# Patient Record
Sex: MALE | Race: BLACK OR AFRICAN AMERICAN | NOT HISPANIC OR LATINO | URBAN - METROPOLITAN AREA
[De-identification: names, ages, dates, MRNs, and addresses within clinical notes are randomized per-mention and may not be internally consistent; named-entity substitution may affect disease eponyms.]

---

## 2022-07-20 ENCOUNTER — EMERGENCY (EMERGENCY)
Facility: HOSPITAL | Age: 32
LOS: 1 days | Discharge: HOME | End: 2022-07-20
Attending: EMERGENCY MEDICINE | Admitting: EMERGENCY MEDICINE
Payer: SELF-PAY

## 2022-07-20 VITALS
OXYGEN SATURATION: 98 % | RESPIRATION RATE: 18 BRPM | SYSTOLIC BLOOD PRESSURE: 122 MMHG | TEMPERATURE: 98 F | HEART RATE: 62 BPM | DIASTOLIC BLOOD PRESSURE: 69 MMHG

## 2022-07-20 VITALS
DIASTOLIC BLOOD PRESSURE: 75 MMHG | WEIGHT: 171.08 LBS | RESPIRATION RATE: 18 BRPM | TEMPERATURE: 99 F | SYSTOLIC BLOOD PRESSURE: 127 MMHG | OXYGEN SATURATION: 98 % | HEIGHT: 75 IN | HEART RATE: 87 BPM

## 2022-07-20 PROCEDURE — 99284 EMERGENCY DEPT VISIT MOD MDM: CPT

## 2022-07-20 PROCEDURE — 96372 THER/PROPH/DIAG INJ SC/IM: CPT

## 2022-07-20 PROCEDURE — 99283 EMERGENCY DEPT VISIT LOW MDM: CPT | Mod: 25

## 2022-07-20 RX ORDER — IBUPROFEN 200 MG
1 TABLET ORAL
Qty: 30 | Refills: 0
Start: 2022-07-20

## 2022-07-20 RX ORDER — LIDOCAINE 4 G/100G
1 CREAM TOPICAL ONCE
Refills: 0 | Status: COMPLETED | OUTPATIENT
Start: 2022-07-20 | End: 2022-07-20

## 2022-07-20 RX ORDER — KETOROLAC TROMETHAMINE 30 MG/ML
30 SYRINGE (ML) INJECTION ONCE
Refills: 0 | Status: DISCONTINUED | OUTPATIENT
Start: 2022-07-20 | End: 2022-07-20

## 2022-07-20 RX ORDER — METHOCARBAMOL 500 MG/1
2 TABLET, FILM COATED ORAL
Qty: 30 | Refills: 0
Start: 2022-07-20 | End: 2022-07-24

## 2022-07-20 RX ADMIN — LIDOCAINE 1 PATCH: 4 CREAM TOPICAL at 14:36

## 2022-07-20 RX ADMIN — Medication 30 MILLIGRAM(S): at 14:35

## 2022-07-20 NOTE — ED PROVIDER NOTE - OBJECTIVE STATEMENT
Patient with no significant past medical history, no past surgical history, presenting with 2 days of bilateral neck pain. Patient points to the suboccipital region.  He states the pain is worse with movement of the head.  Sometimes he feels the pain with associated int pain in the back of his head when he moves.  He awoke with the sx. No clear precipitating factors. Pt was playing basketball the day prior. No trauma, no prolonged neck extension.  No chiropractic manipulations.  No procedures to the neck.  No numbness, tingling, weakness, fever, chills, infectious symptoms, confusion, nor slurred speech.  No IV drug abuse.  He went to urgent care approximately 2 hours prior to arrival, was given Tylenol, and unknown yellow-orange pill that he was told was a muscle relaxant (? Flexeril).  He was not prescribed anything.  He states he still has the pain.

## 2022-07-20 NOTE — ED PROVIDER NOTE - NSFOLLOWUPINSTRUCTIONS_ED_ALL_ED_FT
You have musculoskeletal neck pain. You were treated with an injectable anti-inflammatory (Toradol), and a topical medication (lidocaine). You were also given a muscle relaxant at another facility prior to coming to the ED. It may take a few days of treatment for this to get completely better.    You are being prescribed:  1. High dose Ibuprofen 600mg (anti-inflammatory). Take with food. Next dose 8:30pm  2. Robaxin (muscle relaxant). Use with caution, may cause sedation.     Purchase over-the-counter: Lidocaine patches, Icy Hot, or Romie Mitchell.  Use heat packs.    Follow  up with your  regular medical doctor.       Acute Neck Pain    WHAT YOU NEED TO KNOW:    Acute neck pain starts suddenly, increases quickly, and goes away in a few days. The pain may come and go, or be worse with certain movements. The pain may be only in your neck, or it may move to your arms, back, or shoulders. You may also have pain that starts in another body area and moves to your neck.   Vertebral Column         DISCHARGE INSTRUCTIONS:    Return to the emergency department if:   •You have an injury that causes neck pain and shooting pain down your arms or legs.      •Your neck pain suddenly becomes severe.      •You have neck pain along with numbness, tingling, or weakness in your arms or legs.      •You have a stiff neck, a headache, and a fever.      Call your doctor if:   •You have new or worsening symptoms.      •Your symptoms continue even after treatment.      •You have questions or concerns about your condition or care.      Medicines: You may need any of the following:  •NSAIDs, such as ibuprofen, help decrease swelling, pain, and fever. This medicine is available with or without a doctor's order. NSAIDs can cause stomach bleeding or kidney problems in certain people. If you take blood thinner medicine, always ask your healthcare provider if NSAIDs are safe for you. Always read the medicine label and follow directions.      •Acetaminophen decreases pain and fever. It is available without a doctor's order. Ask how much to take and how often to take it. Follow directions. Read the labels of all other medicines you are using to see if they also contain acetaminophen, or ask your doctor or pharmacist. Acetaminophen can cause liver damage if not taken correctly.      •Steroid medicine may be used to reduce inflammation. This can help relieve pain caused by swelling.      •Muscle relaxers help relax tense muscles and can prevent muscle spasms.      •Nerve medicine may be given if your pain is caused by a nerve problem.      •Take your medicine as directed. Contact your healthcare provider if you think your medicine is not helping or if you have side effects. Tell him or her if you are allergic to any medicine. Keep a list of the medicines, vitamins, and herbs you take. Include the amounts, and when and why you take them. Bring the list or the pill bottles to follow-up visits. Carry your medicine list with you in case of an emergency.      Manage or prevent acute neck pain:   •Rest your neck as directed. Do not make sudden movements, such as turning your head quickly. Your healthcare provider may recommend you wear a cervical collar for a short time. The collar will prevent you from moving your head. This will give your neck time to heal if an injury is causing your neck pain. Ask your healthcare provider when you can return to sports or other normal daily activities.  Cervical Collars           •Apply heat as directed. Heat helps relieve pain and swelling. Use a heat wrap, or soak a small towel in warm water. Wring out the extra water. Apply the heat wrap or towel for 20 minutes every hour, or as directed.      •Apply ice as directed. Ice helps relieve pain and swelling, and can help prevent tissue damage. Use an ice pack, or put ice in a bag. Cover the ice pack or back with a towel before you apply it to your neck. Apply the ice pack or ice for 15 minutes every hour, or as directed. Your healthcare provider can tell you how often to apply ice.      •Do neck exercises as directed. Neck exercises help strengthen the muscles and increase range of motion. Your healthcare provider will tell you which exercises are right for you. He or she may give you instructions or recommend that you work with a physical therapist. Your healthcare provider or therapist can make sure you are doing the exercises correctly.      •Maintain good posture. Try to keep your head and shoulders lifted when you sit. If you work in front of a computer, make sure the monitor is at the right level. You should not need to look up down to see the screen. You should also not have to lean forward to be able to read what is on the screen. Make sure your keyboard, mouse, and other computer items are placed where you do not have to extend your shoulder to reach them. Get up often if you work in front of a computer or sit for long periods of time. Stretch or walk around to keep your neck muscles loose.      Follow up with your doctor as directed: He or she may refer you to a specialist if your pain does not get better with treatment. Write down your questions so you remember to ask them during your visits.      Musculoskeletal Pain    WHAT YOU NEED TO KNOW:    Musculoskeletal pain can occur in muscles, bones, ligaments, tendons, or nerves. The pain can be dull, achy, or sharp. You may have pain and tenderness to the touch as well. The pain can occur anywhere in your body. Musculoskeletal pain can be from an injury, or a medical condition such as polymyositis.    DISCHARGE INSTRUCTIONS:    Return to the emergency department if:   •You have severe pain when you move the area.      •You lose feeling in the area.      •You have new or worse pain or swelling in the area. Your skin may feel tight.      Call your doctor or pain specialist if:   •You have a fever.      •You have pain that does not get better with treatment.      •You have trouble sleeping because of your pain.      •Your painful area becomes more tender, red, and warm to the touch.      •You have less movement of the painful area.      •You have questions or concerns about your condition or care.      Self-care:   •Rest as directed. Avoid activity that causes pain. You may be able to return to normal activity when you can move without pain. Follow directions for rest and activity. You are at risk for injury for 3 weeks after your symptoms go away.      •Ice the painful area to decrease pain and swelling. Use an ice pack, or put ice in a plastic bag and cover it with a towel. Always put a cloth between the ice and your skin. Apply the ice as often as directed for the first 24 to 48 hours.      •Apply compression to the area, if directed. Your healthcare provider may want you to use a splint, brace, or elastic bandage. Compression helps decrease pain and swelling in an arm or leg. A splint, brace, or bandage will also help protect the painful area when you move around.  How to Wrap an Elastic Bandage           •Elevate a painful arm or leg to reduce swelling and pain. Elevate your limb while you are sitting or lying. Prop a painful leg on pillows to keep it above the level of your heart.         Elevate Leg           Medicines: You may need any of the following:  •NSAIDs help decrease swelling and pain or fever. This medicine is available with or without a doctor's order. NSAIDs can cause stomach bleeding or kidney problems in certain people. If you take blood thinner medicine, always ask your healthcare provider if NSAIDs are safe for you. Always read the medicine label and follow directions.      •Acetaminophen decreases pain and fever. It is available without a doctor's order. Ask how much to take and how often to take it. Follow directions. Read the labels of all other medicines you are using to see if they also contain acetaminophen, or ask your doctor or pharmacist. Acetaminophen can cause liver damage if not taken correctly.      •Muscle relaxers help relax your muscles to decrease pain and muscle spasms.      •Steroids may be given to decrease redness, pain, and swelling.      •Take your medicine as directed. Contact your healthcare provider if you think your medicine is not helping or if you have side effects. Tell him or her if you are allergic to any medicine. Keep a list of the medicines, vitamins, and herbs you take. Include the amounts, and when and why you take them. Bring the list or the pill bottles to follow-up visits. Carry your medicine list with you in case of an emergency.      Follow up with your doctor or pain specialist as directed: You may need more tests to help healthcare providers find the cause of your muscle pain. You may need physical therapy to learn muscle strengthening exercises. Write down your questions so you remember to ask them during your visits.      Tension Headache, Adult      A tension headache is a feeling of pain, pressure, or aching over the front and sides of the head. The pain can be dull, or it can feel tight. There are two types of tension headache:  •Episodic tension headache. This is when the headaches happen fewer than 15 days a month.      •Chronic tension headache. This is when the headaches happen more than 15 days a month during a 3-month period.      A tension headache can last from 30 minutes to several days. It is the most common kind of headache. Tension headaches are not normally associated with nausea or vomiting, and they do not get worse with physical activity.      What are the causes?    The exact cause of this condition is not known. Tension headaches are often triggered by stress, anxiety, or depression. Other triggers may include:  •Alcohol.      •Too much caffeine or caffeine withdrawal.      •Respiratory infections, such as colds, flu, or sinus infections.      •Dental problems or teeth clenching.      •Fatigue.      •Holding your head and neck in the same position for a long period of time, such as while using a computer.      •Smoking.      •Arthritis of the neck.        What are the signs or symptoms?    Symptoms of this condition include:  •A feeling of pressure or tightness around the head.      •Dull, aching head pain.      •Pain over the front and sides of the head.      •Tenderness in the muscles of the head, neck, and shoulders.        How is this diagnosed?    This condition may be diagnosed based on your symptoms, your medical history, and a physical exam.    If your symptoms are severe or unusual, you may have imaging tests, such as a CT scan or an MRI of your head. Your vision may also be checked.      How is this treated?    This condition may be treated with lifestyle changes and with medicines that help relieve symptoms.      Follow these instructions at home:    Managing pain     •Take over-the-counter and prescription medicines only as told by your health care provider.      •When you have a headache, lie down in a dark, quiet room.    •If directed, put ice on your head and neck. To do this:  •Put ice in a plastic bag.      •Place a towel between your skin and the bag.      •Leave the ice on for 20 minutes, 2–3 times a day.      •Remove the ice if your skin turns bright red. This is very important. If you cannot feel pain, heat, or cold, you have a greater risk of damage to the area.      •If directed, apply heat to the back of your neck as often as told by your health care provider. Use the heat source that your health care provider recommends, such as a moist heat pack or a heating pad.  •Place a towel between your skin and the heat source.      •Leave the heat on for 20–30 minutes.      •Remove the heat if your skin turns bright red. This is especially important if you are unable to feel pain, heat, or cold. You have a greater risk of getting burned.        Eating and drinking     •Eat meals on a regular schedule.    •If you drink alcohol:•Limit how much you have to:  •0–1 drink a day for women who are not pregnant.      •0–2 drinks a day for men.        •Know how much alcohol is in your drink. In the [place] one drink equals one 12 oz bottle of beer (355 mL), one 5 oz glass of wine (148 mL), or one 1½ oz glass of hard liquor (44 mL).        •Drink enough fluid to keep your urine pale yellow.      •Decrease your caffeine intake, or stop using caffeine.      Lifestyle     •Get 7–9 hours of sleep each night, or get the amount of sleep recommended by your health care provider.      •At bedtime, remove computers, phones, and tablets from your room.    •Find ways to manage your stress. This may include:  •Exercise.      •Deep breathing exercises.      •Yoga.      •Listening to music.      •Positive mental imagery.        •Try to sit up straight and avoid tensing your muscles.      • Do not use any products that contain nicotine or tobacco. These include cigarettes, chewing tobacco, and vaping devices, such as e-cigarettes. If you need help quitting, ask your health care provider.        General instructions    •Avoid any headache triggers. Keep a journal to help find out what may trigger your headaches. For example, write down:  •What you eat and drink.      •How much sleep you get.      •Any change to your diet or medicines.        •Keep all follow-up visits. This is important.        Contact a health care provider if:    •Your headache does not get better.      •Your headache comes back.      •You are sensitive to sounds, light, or smells because of a headache.      •You have nausea or you vomit.      •Your stomach hurts.        Get help right away if:  •You suddenly develop a severe headache, along with any of the following:  •A stiff neck.      •Nausea and vomiting.      •Confusion.      •Weakness in one part or one side of your body.      •Double vision or loss of vision.      •Shortness of breath.      •Rash.      •Unusual sleepiness.      •Fever or chills.      •Trouble speaking.      •Pain in your eye or ear.      •Trouble walking or balancing.      •Feeling faint or passing out.          Summary    •A tension headache is a feeling of pain, pressure, or aching over the front and sides of the head.      •A tension headache can last from 30 minutes to several days. It is the most common kind of headache.      •This condition may be diagnosed based on your symptoms, your medical history, and a physical exam.      •This condition may be treated with lifestyle changes and with medicines that help relieve symptoms.      This information is not intended to replace advice given to you by your health care provider. Make sure you discuss any questions you have with your health care provider.

## 2022-07-20 NOTE — ED ADULT TRIAGE NOTE - CHIEF COMPLAINT QUOTE
Pt endorses neck pain and intermittent headaches x2 days. Pt denies any injury, went to  where he was given muscle relaxer than helped somewhat. Pt states it started two days ago when he woke up. No fevers, +pain when turning neck. No headache or body aches.

## 2022-07-20 NOTE — ED PROVIDER NOTE - CLINICAL SUMMARY MEDICAL DECISION MAKING FREE TEXT BOX
Pt p/w MSK neck strain. No midline ttp. No neurologic signs or symptoms on history or exam. No numbness/tingling or paresthesias. Gait is normal. No bowel or bladder incontinence. No evidence of cord compression or cauda equina syndrome. In addition, there is no fever and no risk factors or evidence of epidural abscess. No evidence of cord transection or metastatic process or other acute spinal cord injury. No trauma. No infectious sx. No fever. Patient has a normal neuro exam and neck exam. Pt given pain meds and will re-eval. Anticipate d/c w/ f/u PCP if sx improved, w/ continued tx.

## 2022-07-20 NOTE — ED PROVIDER NOTE - NS ED ROS FT
Constitutional: No fever or chills.   Eyes: No pain, blurry vision, or discharge.  ENMT: No hearing changes, pain, discharge or infections. No neck pain or stiffness.  Cardiac: No chest pain, SOB or edema. No chest pain with exertion.  Respiratory: No cough or respiratory distress. No hemoptysis. No history of asthma or RAD.  GI: No nausea, vomiting, diarrhea or abdominal pain.  : No dysuria, frequency or burning.  MS: See HPI  Neuro: No  weakness. No LOC.  Skin: No skin rash.   Endocrine: No history of thyroid disease or diabetes.  Except as documented in the HPI, all other systems are negative.

## 2022-07-20 NOTE — ED ADULT NURSE NOTE - OBJECTIVE STATEMENT
pt presents to ER c/o neck pain after waking up two days ago. pt c/o increased pain with movement. denies fevers. c/o an intermittent headache.

## 2022-07-20 NOTE — ED PROVIDER NOTE - PHYSICAL EXAMINATION
Constitutional: Well appearing, awake, alert, oriented to person, place, time/situation and in no apparent distress.  ENMT: Airway patent. Normal MM  Eyes: Clear bilaterally, PERRL, EOMI. No nystagus  Cardiac: Normal rate, regular rhythm.  Heart sounds S1, S2.  No murmurs, rubs or gallops. no carotid bruits or thrills  Respiratory: Breaths sounds equal and clear b/l. No increased WOB, tachypnea, hypoxia, or accessory mm use. Pt speaks in full sentences.   Musculoskeletal: Spine appears normal. No midline spinal ttp. + b/l lateral upper cervical / suboccipital ttp.   Neuro: Alert & Oriented x 3. CN II-XII intact. No facial droop. Clear speech. WILKINS w/ 5/5 strength x 4 ext. Normal sensation. No pronator drift. Normal gait   Skin: Skin normal color for race, warm, dry and intact. No evidence of rash.  Psych: Alert and oriented to person, place, time/situation. normal mood and affect. no apparent risk to self or others.

## 2022-07-24 DIAGNOSIS — M54.2 CERVICALGIA: ICD-10-CM

## 2022-07-24 DIAGNOSIS — R51.9 HEADACHE, UNSPECIFIED: ICD-10-CM

## 2023-05-30 PROBLEM — Z00.00 ENCOUNTER FOR PREVENTIVE HEALTH EXAMINATION: Status: ACTIVE | Noted: 2023-05-30

## 2023-06-23 ENCOUNTER — NON-APPOINTMENT (OUTPATIENT)
Age: 33
End: 2023-06-23

## 2023-06-27 ENCOUNTER — APPOINTMENT (OUTPATIENT)
Dept: UROLOGY | Facility: CLINIC | Age: 33
End: 2023-06-27